# Patient Record
Sex: FEMALE | Race: WHITE | NOT HISPANIC OR LATINO | Employment: FULL TIME | URBAN - METROPOLITAN AREA
[De-identification: names, ages, dates, MRNs, and addresses within clinical notes are randomized per-mention and may not be internally consistent; named-entity substitution may affect disease eponyms.]

---

## 2021-07-15 ENCOUNTER — VBI (OUTPATIENT)
Dept: ADMINISTRATIVE | Facility: OTHER | Age: 56
End: 2021-07-15

## 2021-07-15 NOTE — TELEPHONE ENCOUNTER
Upon review of the In Basket request we were able to locate, review, and update the patient chart as requested for Mammogram and Pap Smear (HPV) aka Cervical Cancer Screening  Any additional questions or concerns should be emailed to the Practice Liaisons via [email or web address] email, please do not reply via In Basket      Thank you  Rommel Moreno

## 2021-09-20 ENCOUNTER — ANNUAL EXAM (OUTPATIENT)
Dept: OBGYN CLINIC | Facility: CLINIC | Age: 56
End: 2021-09-20
Payer: COMMERCIAL

## 2021-09-20 VITALS — WEIGHT: 137 LBS | HEIGHT: 64 IN | BODY MASS INDEX: 23.39 KG/M2

## 2021-09-20 DIAGNOSIS — Z01.419 ENCOUNTER FOR GYNECOLOGICAL EXAMINATION (GENERAL) (ROUTINE) WITHOUT ABNORMAL FINDINGS: Primary | ICD-10-CM

## 2021-09-20 DIAGNOSIS — Z12.31 ENCOUNTER FOR SCREENING MAMMOGRAM FOR BREAST CANCER: ICD-10-CM

## 2021-09-20 PROCEDURE — S0612 ANNUAL GYNECOLOGICAL EXAMINA: HCPCS | Performed by: OBSTETRICS & GYNECOLOGY

## 2021-09-20 RX ORDER — RIBOFLAVIN (VITAMIN B2) 100 MG
100 TABLET ORAL DAILY
COMMUNITY

## 2021-09-20 RX ORDER — ZINC GLUCONATE 50 MG
50 TABLET ORAL DAILY
COMMUNITY

## 2021-09-20 RX ORDER — MELATONIN
1000 DAILY
COMMUNITY

## 2021-09-20 RX ORDER — LEVOTHYROXINE SODIUM 75 MCG
TABLET ORAL
COMMUNITY
Start: 2021-08-11

## 2021-09-20 NOTE — PATIENT INSTRUCTIONS
Return to office in one year unless having any problems such as bleeding, new persistent pain, new progressive bloating, new problems eating (getting full to quickly) or new constant urinary pressure that does not resolve in one week  Continue to strive for total calcium intake of 1500 mg and vitamin D of 1000 IU in combined dietary and supplement forms  Avoid excess calcium as this may adversely effect the arteries in the heart

## 2021-09-20 NOTE — PROGRESS NOTES
Assessment/Plan:    Encounter for gynecological examination (general) (routine) without abnormal findings  All well, no complaints  Father doing well at [facility]  Normal breast and pelvic exams  Pap normal last year  Mammo order given  Dexa @64, no risk factors, calcium recs reviewed  Colonoscopy up to date  Declines COVID vaccination, discussed, encouraged  Diagnoses and all orders for this visit:    Encounter for gynecological examination (general) (routine) without abnormal findings    Encounter for screening mammogram for breast cancer  -     Mammo screening bilateral w 3d & cad; Future    Other orders  -     Synthroid 75 MCG tablet  -     hyoscyamine (LEVSIN/SL) 0 125 mg SL tablet  -     Ascorbic Acid (vitamin C) 100 MG tablet; Take 100 mg by mouth daily  -     cholecalciferol (VITAMIN D3) 1,000 units tablet; Take 1,000 Units by mouth daily  -     zinc gluconate 50 mg tablet; Take 50 mg by mouth daily          Subjective:      Patient ID: John Watkins is a 61 y o  female  Here for well check  The following portions of the patient's history were reviewed and updated as appropriate:   She  has a past medical history of Hypothyroidism and Papanicolaou smear (09/03/2020)  She   Patient Active Problem List    Diagnosis Date Noted    Encounter for gynecological examination (general) (routine) without abnormal findings 09/20/2021     She  has a past surgical history that includes Mammo (historical) (08/11/2021) and Colonoscopy (2018)  Her family history includes Colon cancer (age of onset: 61) in her father  She  reports that she has never smoked  She has never used smokeless tobacco  She reports current alcohol use  She reports that she does not use drugs    Current Outpatient Medications   Medication Sig Dispense Refill    Ascorbic Acid (vitamin C) 100 MG tablet Take 100 mg by mouth daily      cholecalciferol (VITAMIN D3) 1,000 units tablet Take 1,000 Units by mouth daily  hyoscyamine (LEVSIN/SL) 0 125 mg SL tablet       Synthroid 75 MCG tablet       zinc gluconate 50 mg tablet Take 50 mg by mouth daily       No current facility-administered medications for this visit  She has No Known Allergies       Review of Systems  No PMB, breast, bladder, bowel changes   No new persistent pain, bloating, early satiety or pelvic pressure      Objective:      Ht 5' 3 75" (1 619 m)   Wt 62 1 kg (137 lb)   Breastfeeding No   BMI 23 70 kg/m²          Physical Exam  General appearance: no distress, pleasant  Neck: thyroid without nodules or thyromegaly, no palpable adenopathy  Lymph nodes: no palpable adenopathy  Breasts: no masses, nodes or skin changes  Abdomen: soft, non tender, no palpable masses  Pelvic exam: normal atrophic external genitalia, urethral meatus normal, vagina atrophic without lesions, cervix atrophic without lesions, uterus small, non tender, no adnexal masses, non tender  Rectal exam: normal sphincter tone, no masses, RV confirms above

## 2021-09-20 NOTE — ASSESSMENT & PLAN NOTE
All well, no complaints  Father doing well at [facility]  Normal breast and pelvic exams  Pap normal last year  Mammo order given  Dexa @64, no risk factors, calcium recs reviewed  Colonoscopy up to date  Declines COVID vaccination, discussed, encouraged

## 2021-09-20 NOTE — LETTER
September 20, 2021     [facility], [address] [place], [place] [id number]    Patient: Ting Goetz   YOB: 1965   Date of Visit: 9/20/2021       Dear Dr Vanna Randolph: Thank you for referring Adelina Robledo to me for evaluation  Below are my notes for this consultation  If you have questions, please do not hesitate to call me  I look forward to following your patient along with you  Sincerely,        Spenser Hendrix MD        CC: No Recipients  Spenser Hendrix MD  9/20/2021  9:44 AM  Sign when Signing Visit  Assessment/Plan:    Encounter for gynecological examination (general) (routine) without abnormal findings  All well, no complaints  Father doing well at [facility]  Normal breast and pelvic exams  Pap normal last year  Mammo order given  Dexa @64, no risk factors, calcium recs reviewed  Colonoscopy up to date  Declines COVID vaccination, discussed, encouraged  Diagnoses and all orders for this visit:    Encounter for gynecological examination (general) (routine) without abnormal findings    Encounter for screening mammogram for breast cancer  -     Mammo screening bilateral w 3d & cad; Future    Other orders  -     Synthroid 75 MCG tablet  -     hyoscyamine (LEVSIN/SL) 0 125 mg SL tablet  -     Ascorbic Acid (vitamin C) 100 MG tablet; Take 100 mg by mouth daily  -     cholecalciferol (VITAMIN D3) 1,000 units tablet; Take 1,000 Units by mouth daily  -     zinc gluconate 50 mg tablet; Take 50 mg by mouth daily          Subjective:      Patient ID: Ting Goetz is a 61 y o  female  Here for well check  The following portions of the patient's history were reviewed and updated as appropriate:   She  has a past medical history of Hypothyroidism and Papanicolaou smear (09/03/2020)    She   Patient Active Problem List    Diagnosis Date Noted    Encounter for gynecological examination (general) (routine) without abnormal findings 09/20/2021     She  has a past surgical history that includes Mammo (historical) (08/11/2021) and Colonoscopy (2018)  Her family history includes Colon cancer (age of onset: 61) in her father  She  reports that she has never smoked  She has never used smokeless tobacco  She reports current alcohol use  She reports that she does not use drugs  Current Outpatient Medications   Medication Sig Dispense Refill    Ascorbic Acid (vitamin C) 100 MG tablet Take 100 mg by mouth daily      cholecalciferol (VITAMIN D3) 1,000 units tablet Take 1,000 Units by mouth daily      hyoscyamine (LEVSIN/SL) 0 125 mg SL tablet       Synthroid 75 MCG tablet       zinc gluconate 50 mg tablet Take 50 mg by mouth daily       No current facility-administered medications for this visit  She has No Known Allergies       Review of Systems  No PMB, breast, bladder, bowel changes   No new persistent pain, bloating, early satiety or pelvic pressure      Objective:      Ht 5' 3 75" (1 619 m)   Wt 62 1 kg (137 lb)   Breastfeeding No   BMI 23 70 kg/m²          Physical Exam  General appearance: no distress, pleasant  Neck: thyroid without nodules or thyromegaly, no palpable adenopathy  Lymph nodes: no palpable adenopathy  Breasts: no masses, nodes or skin changes  Abdomen: soft, non tender, no palpable masses  Pelvic exam: normal atrophic external genitalia, urethral meatus normal, vagina atrophic without lesions, cervix atrophic without lesions, uterus small, non tender, no adnexal masses, non tender  Rectal exam: normal sphincter tone, no masses, RV confirms above

## 2022-08-17 DIAGNOSIS — Z12.31 ENCOUNTER FOR SCREENING MAMMOGRAM FOR BREAST CANCER: ICD-10-CM

## 2022-09-21 NOTE — PROGRESS NOTES
Assessment/Plan:    Encounter for gynecological examination (general) (routine) without abnormal findings  All well  Treating for UTI this week, first in years  Following with PCP for high glucose on routine blood work, scheduled for HgA1C  Normal breast and pelvic exams  Last pap 8/2020, neg/HPV neg; due by 2025  Mammo order given, last 8/2022  Dexa @64  Colonoscopy 2018, due 2023       Diagnoses and all orders for this visit:    Encounter for screening mammogram for breast cancer  -     Mammo screening bilateral w 3d & cad; Future    Encounter for gynecological examination (general) (routine) without abnormal findings          Subjective:      Patient ID: Amanda Valenzuela is a 61 y o  female  HPI Here for well check  The following portions of the patient's history were reviewed and updated as appropriate:   She  has a past medical history of Hypothyroidism and Papanicolaou smear (09/03/2020)  She   Patient Active Problem List    Diagnosis Date Noted    Encounter for gynecological examination (general) (routine) without abnormal findings 09/20/2021     She  has a past surgical history that includes Mammo (historical) (08/11/2021) and Colonoscopy (2018)  Her family history includes Colon cancer (age of onset: 61) in her father; Diabetes in her mother and paternal grandmother; Hypertension in her mother  She  reports that she has never smoked  She has never used smokeless tobacco  She reports current alcohol use  She reports that she does not use drugs  Current Outpatient Medications   Medication Sig Dispense Refill    Ascorbic Acid (vitamin C) 100 MG tablet Take 100 mg by mouth daily      hyoscyamine (LEVSIN/SL) 0 125 mg SL tablet       Synthroid 75 MCG tablet        No current facility-administered medications for this visit  She has No Known Allergies       Review of Systems  No PMB, breast, bladder, bowel changes   No new persistent pain, bloating, early satiety or pelvic pressure      Objective:      /80   Ht 5' 3" (1 6 m)   Wt 61 2 kg (135 lb)   Breastfeeding No   BMI 23 91 kg/m²          Physical Exam    General appearance: no distress, pleasant  Neck: thyroid without nodules or thyromegaly, no palpable adenopathy  Lymph nodes: no palpable adenopathy  Breasts: no masses, nodes or skin changes  Abdomen: soft, non tender, no palpable masses  Pelvic exam: normal atrophic external genitalia, urethral meatus normal, vagina atrophic without lesions, cervix atrophic without lesions, uterus small, non tender, no adnexal masses, non tender  Rectal exam: normal sphincter tone, no masses, RV confirms above

## 2022-09-22 ENCOUNTER — ANNUAL EXAM (OUTPATIENT)
Dept: OBGYN CLINIC | Facility: CLINIC | Age: 57
End: 2022-09-22

## 2022-09-22 VITALS
DIASTOLIC BLOOD PRESSURE: 80 MMHG | SYSTOLIC BLOOD PRESSURE: 124 MMHG | BODY MASS INDEX: 23.92 KG/M2 | HEIGHT: 63 IN | WEIGHT: 135 LBS

## 2022-09-22 DIAGNOSIS — Z12.31 ENCOUNTER FOR SCREENING MAMMOGRAM FOR BREAST CANCER: ICD-10-CM

## 2022-09-22 DIAGNOSIS — Z01.419 ENCOUNTER FOR GYNECOLOGICAL EXAMINATION (GENERAL) (ROUTINE) WITHOUT ABNORMAL FINDINGS: Primary | ICD-10-CM

## 2022-09-22 NOTE — PATIENT INSTRUCTIONS
Return to office in one year unless having any problems such as breast changes, bleeding, new persistent pain, new progressive bloating, new problems eating (getting full to quickly) or new constant urinary pressure that does not resolve in one week  Call in six months to schedule your annual visit  Try colace daily to soften the stool and help with constipation symptoms

## 2022-09-22 NOTE — ASSESSMENT & PLAN NOTE
All well  Treating for UTI this week, first in years  Following with PCP for high glucose on routine blood work, scheduled for HgA1C  Normal breast and pelvic exams     Last pap 8/2020, neg/HPV neg; due by 2025  Mammo order given, last 8/2022  Dexa @64  Colonoscopy 2018, due 2023

## 2023-09-14 DIAGNOSIS — Z12.31 ENCOUNTER FOR SCREENING MAMMOGRAM FOR BREAST CANCER: ICD-10-CM

## 2023-10-30 ENCOUNTER — ANNUAL EXAM (OUTPATIENT)
Dept: OBGYN CLINIC | Facility: CLINIC | Age: 58
End: 2023-10-30
Payer: COMMERCIAL

## 2023-10-30 VITALS
DIASTOLIC BLOOD PRESSURE: 74 MMHG | SYSTOLIC BLOOD PRESSURE: 124 MMHG | BODY MASS INDEX: 24.1 KG/M2 | WEIGHT: 136 LBS | HEIGHT: 63 IN

## 2023-10-30 DIAGNOSIS — Z01.419 ENCOUNTER FOR GYNECOLOGICAL EXAMINATION (GENERAL) (ROUTINE) WITHOUT ABNORMAL FINDINGS: Primary | ICD-10-CM

## 2023-10-30 DIAGNOSIS — Z12.31 ENCOUNTER FOR SCREENING MAMMOGRAM FOR MALIGNANT NEOPLASM OF BREAST: ICD-10-CM

## 2023-10-30 PROCEDURE — S0612 ANNUAL GYNECOLOGICAL EXAMINA: HCPCS | Performed by: OBSTETRICS & GYNECOLOGY

## 2023-10-30 RX ORDER — CYCLOSPORINE 0.5 MG/ML
1 EMULSION OPHTHALMIC 2 TIMES DAILY
COMMUNITY

## 2023-10-30 NOTE — ASSESSMENT & PLAN NOTE
Here for well check, no breast or gyn complaints. Normal breast and pelvic exams. Last pap 8/2020 neg/HPV neg; due 2025  Mammo order given, last 9/6/23  Colonoscopy 10/2022, due 2025?, pt will find out  Dexa @64, no risk factors.  Calcium recs reviewed

## 2024-02-21 PROBLEM — Z01.419 ENCOUNTER FOR GYNECOLOGICAL EXAMINATION (GENERAL) (ROUTINE) WITHOUT ABNORMAL FINDINGS: Status: RESOLVED | Noted: 2021-09-20 | Resolved: 2024-02-21

## 2024-09-17 ENCOUNTER — TELEPHONE (OUTPATIENT)
Age: 59
End: 2024-09-17

## 2024-09-17 DIAGNOSIS — R92.8 ABNORMAL MAMMOGRAM OF LEFT BREAST: Primary | ICD-10-CM

## 2024-09-17 NOTE — TELEPHONE ENCOUNTER
Spoke with Jody as to Mammogram results and faxing (L) Breast Diagn. Mammo & Breast U/S orders to [facility]. Will also forward message to Dr. Pickard.

## 2024-09-17 NOTE — TELEPHONE ENCOUNTER
Pt called regarding recent mammo completed at [facility]. Pt states [facility] reached out and recommended further imaging needed for L breast only. [facility] also communicated based upon additional imaging, pt may need an US as well. Pt is requesting Dr. Pickard F/U regarding recommendations.    Provided [facility] Fax number for any further testing, as pt completes mammo's at [facility].    [phone]

## 2024-09-18 NOTE — TELEPHONE ENCOUNTER
Faxed Mammo & Breast U/S orders this morning to [facility] due to power outage in office yesterday afternoon. Received fax confirmation.

## 2024-10-16 ENCOUNTER — TELEPHONE (OUTPATIENT)
Age: 59
End: 2024-10-16

## 2024-10-16 DIAGNOSIS — R92.8 ABNORMAL MAMMOGRAM: Primary | ICD-10-CM

## 2024-10-16 NOTE — TELEPHONE ENCOUNTER
Nicky from [facility] called in advising patient has an appt for her addtl breast imaging scheduled with them tomorrow and they need a corrected ultrasound script. States mammo script is correct, however US script should be for Left breast limited with DX code R92.8 instead of bilateral. Would need this faxed to [phone]. Please place updated order and we can fax over.

## 2024-11-13 NOTE — PROGRESS NOTES
Assessment/Plan:    Encounter for gynecological examination (general) (routine) without abnormal findings  Here for well check, doing great, stopped all processed foods.   No breast or gyn concerns.  Normal breast and pelvic exams.  Last pap 8/2020 neg/HPV neg; repeated today  Mammo order given, last 9/13/24 with f/u 10/17/24 on left BIRADS 1B  Colonoscopy 2022, due 2027  Dexa @64, no risk factors. Calcium recs given.       Diagnoses and all orders for this visit:    Encounter for gynecological examination (general) (routine) without abnormal findings    Encounter for screening mammogram for malignant neoplasm of breast  -     Mammo screening bilateral w 3d and cad; Future    Screening for malignant neoplasm of the cervix  -     IGP, Aptima HPV, Rfx 16/18,45    Other orders  -     hyoscyamine (HYOSYNE) 0.125 mg/mL oral drops            Subjective:      Patient ID: Jody Cochran is a 59 y.o. female.    HPI Here for well check.    The following portions of the patient's history were reviewed and updated as appropriate: She  has a past medical history of Gestational diabetes (1993), History of gestational diabetes, Hypothyroidism, and Papanicolaou smear (09/03/2020).  She There are no active problems to display for this patient.    She  has a past surgical history that includes Mammo (historical) (08/11/2021); Colonoscopy (10/2022); and Cholecystectomy (2012).  Her family history includes Colon cancer (age of onset: 60) in her father; Diabetes in her mother and paternal grandmother; Hypertension in her mother.  She  reports that she has never smoked. She has never been exposed to tobacco smoke. She has never used smokeless tobacco. She reports that she does not currently use alcohol. She reports that she does not use drugs.  Current Outpatient Medications   Medication Sig Dispense Refill    Ascorbic Acid (vitamin C) 100 MG tablet Take 100 mg by mouth daily      cycloSPORINE (RESTASIS) 0.05 % ophthalmic  "emulsion 1 drop 2 (two) times a day      hyoscyamine (HYOSYNE) 0.125 mg/mL oral drops  Synthroid 75 mcg        No current facility-administered medications for this visit.     She has no known allergies..    Review of Systems  No PMB, breast, bladder, bowel changes. No new persistent pain, bloating, early satiety or pelvic pressure      Objective:      /82 (BP Location: Left arm, Patient Position: Sitting, Cuff Size: Standard)   Ht 5' 3.75\" (1.619 m)   Wt 59 kg (130 lb)   BMI 22.49 kg/m²        Physical Exam    General appearance: no distress, pleasant  Neck: thyroid without nodules or thyromegaly, no palpable adenopathy  Lymph nodes: no palpable adenopathy  Breasts: no masses, nodes or skin changes  Abdomen: soft, non tender, no palpable masses  Pelvic exam: normal atrophic external genitalia, urethral meatus normal, vagina atrophic without lesions, cervix atrophic without lesions, uterus small, non tender, no adnexal masses, non tender  Rectal exam: normal sphincter tone, no masses, RV confirms above    "

## 2024-11-14 ENCOUNTER — ANNUAL EXAM (OUTPATIENT)
Dept: OBGYN CLINIC | Facility: CLINIC | Age: 59
End: 2024-11-14
Payer: COMMERCIAL

## 2024-11-14 VITALS
DIASTOLIC BLOOD PRESSURE: 82 MMHG | BODY MASS INDEX: 22.2 KG/M2 | HEIGHT: 64 IN | SYSTOLIC BLOOD PRESSURE: 132 MMHG | WEIGHT: 130 LBS

## 2024-11-14 DIAGNOSIS — Z01.419 ENCOUNTER FOR GYNECOLOGICAL EXAMINATION (GENERAL) (ROUTINE) WITHOUT ABNORMAL FINDINGS: Primary | ICD-10-CM

## 2024-11-14 DIAGNOSIS — Z12.31 ENCOUNTER FOR SCREENING MAMMOGRAM FOR MALIGNANT NEOPLASM OF BREAST: ICD-10-CM

## 2024-11-14 DIAGNOSIS — Z12.4 SCREENING FOR MALIGNANT NEOPLASM OF THE CERVIX: ICD-10-CM

## 2024-11-14 PROCEDURE — S0612 ANNUAL GYNECOLOGICAL EXAMINA: HCPCS | Performed by: OBSTETRICS & GYNECOLOGY

## 2024-11-14 RX ORDER — LEVOTHYROXINE SODIUM 75 UG/1
TABLET ORAL
COMMUNITY
End: 2024-11-14 | Stop reason: SDUPTHER

## 2024-11-14 RX ORDER — HYOSCYAMINE SULFATE 0.12 MG/ML
LIQUID ORAL
COMMUNITY

## 2024-11-14 NOTE — LETTER
November 14, 2024     [facility]  [address] [id number]    Patient: Jody Cochran   YOB: 1965   Date of Visit: 11/14/2024       Dear Dr. Leal:    Jody Cochran was in today for her annual gyn exam. Below are my notes for this visit.    If you have questions, please do not hesitate to call me. I look forward to following your patient along with you.         Sincerely,        Allison Pickard MD        CC: No Recipients    Allison Pickard MD  11/14/2024  2:50 PM  Sign when Signing Visit  Assessment/Plan:    Encounter for gynecological examination (general) (routine) without abnormal findings  Here for well check, doing great, stopped all processed foods.   No breast or gyn concerns.  Normal breast and pelvic exams.  Last pap 8/2020 neg/HPV neg; repeated today  Mammo order given, last 9/13/24 with f/u 10/17/24 on left BIRADS 1B  Colonoscopy 2022, due 2027  Dexa @65, no risk factors. Calcium recs given.       Diagnoses and all orders for this visit:    Encounter for gynecological examination (general) (routine) without abnormal findings    Encounter for screening mammogram for malignant neoplasm of breast  -     Mammo screening bilateral w 3d and cad; Future    Screening for malignant neoplasm of the cervix  -     IGP, Aptima HPV, Rfx 16/18,45    Other orders  -     hyoscyamine (HYOSYNE) 0.125 mg/mL oral drops            Subjective:      Patient ID: Jody Cochran is a 59 y.o. female.    HPI Here for well check.    The following portions of the patient's history were reviewed and updated as appropriate: She  has a past medical history of Gestational diabetes (1993), History of gestational diabetes, Hypothyroidism, and Papanicolaou smear (09/03/2020).  She There are no active problems to display for this patient.    She  has a past surgical history that includes Mammo (historical) (08/11/2021); Colonoscopy (10/2022); and Cholecystectomy (2012).  Her family history  "includes Colon cancer (age of onset: 60) in her father; Diabetes in her mother and paternal grandmother; Hypertension in her mother.  She  reports that she has never smoked. She has never been exposed to tobacco smoke. She has never used smokeless tobacco. She reports that she does not currently use alcohol. She reports that she does not use drugs.  Current Outpatient Medications   Medication Sig Dispense Refill   • Ascorbic Acid (vitamin C) 100 MG tablet Take 100 mg by mouth daily     • cycloSPORINE (RESTASIS) 0.05 % ophthalmic emulsion 1 drop 2 (two) times a day     • hyoscyamine (HYOSYNE) 0.125 mg/mL oral drops  Synthroid 75 mcg        No current facility-administered medications for this visit.     She has no known allergies..    Review of Systems  No PMB, breast, bladder, bowel changes. No new persistent pain, bloating, early satiety or pelvic pressure      Objective:      /82 (BP Location: Left arm, Patient Position: Sitting, Cuff Size: Standard)   Ht 5' 3.75\" (1.619 m)   Wt 59 kg (130 lb)   BMI 22.49 kg/m²        Physical Exam    General appearance: no distress, pleasant  Neck: thyroid without nodules or thyromegaly, no palpable adenopathy  Lymph nodes: no palpable adenopathy  Breasts: no masses, nodes or skin changes  Abdomen: soft, non tender, no palpable masses  Pelvic exam: normal atrophic external genitalia, urethral meatus normal, vagina atrophic without lesions, cervix atrophic without lesions, uterus small, non tender, no adnexal masses, non tender  Rectal exam: normal sphincter tone, no masses, RV confirms above    "

## 2024-11-14 NOTE — ASSESSMENT & PLAN NOTE
Here for well check, doing great, stopped all processed foods.   No breast or gyn concerns.  Normal breast and pelvic exams.  Last pap 8/2020 neg/HPV neg; repeated today  Mammo order given, last 9/13/24 with f/u 10/17/24 on left BIRADS 1B  Colonoscopy 2022, due 2027  Dexa @65, no risk factors. Calcium recs given.

## 2024-11-14 NOTE — PATIENT INSTRUCTIONS
Return to office in one year unless having any problems such as breast changes, bleeding, new persistent pain, new progressive bloating, new problems eating (getting full to quickly) or new constant urinary pressure that does not resolve in one week.    Continue to strive for 1200 mg of calcium and 1000 IU of vitamin D daily in diet and supplements combined for your bone health.  You can only absorb 600 mg of calcium at a time. Avoid excess calcium as this may adversely effect your heart.  There are 400 mg of calcium in an 8 oz serving of dairy.  [place] milk has 600 mg of calcium in an 8 oz serving.

## 2024-11-21 LAB
CYTOLOGIST CVX/VAG CYTO: NORMAL
DX ICD CODE: NORMAL
HPV GENOTYPE REFLEX: NORMAL
HPV I/H RISK 4 DNA CVX QL PROBE+SIG AMP: NEGATIVE
Lab: NORMAL
OTHER STN SPEC: NORMAL
PATH REPORT.FINAL DX SPEC: NORMAL
SL AMB NOTE:: NORMAL
SL AMB SPECIMEN ADEQUACY: NORMAL
SL AMB TEST METHODOLOGY: NORMAL

## 2024-11-22 ENCOUNTER — RESULTS FOLLOW-UP (OUTPATIENT)
Dept: OBGYN CLINIC | Facility: CLINIC | Age: 59
End: 2024-11-22